# Patient Record
Sex: MALE | Race: WHITE | URBAN - METROPOLITAN AREA
[De-identification: names, ages, dates, MRNs, and addresses within clinical notes are randomized per-mention and may not be internally consistent; named-entity substitution may affect disease eponyms.]

---

## 2020-02-04 ENCOUNTER — APPOINTMENT (RX ONLY)
Dept: URBAN - METROPOLITAN AREA CLINIC 40 | Facility: CLINIC | Age: 50
Setting detail: DERMATOLOGY
End: 2020-02-04

## 2020-02-04 DIAGNOSIS — L80 VITILIGO: ICD-10-CM

## 2020-02-04 DIAGNOSIS — L81.4 OTHER MELANIN HYPERPIGMENTATION: ICD-10-CM

## 2020-02-04 PROCEDURE — ? COUNSELING

## 2020-02-04 PROCEDURE — 99202 OFFICE O/P NEW SF 15 MIN: CPT

## 2020-02-04 PROCEDURE — ? ADDITIONAL NOTES

## 2020-02-04 PROCEDURE — ? PRESCRIPTION

## 2020-02-04 RX ORDER — FLUOCINONIDE 0.5 MG/G
OINTMENT TOPICAL
Qty: 1 | Refills: 1 | Status: ERX | COMMUNITY
Start: 2020-02-04

## 2020-02-04 RX ORDER — TACROLIMUS 1 MG/G
OINTMENT TOPICAL
Qty: 1 | Refills: 1 | Status: ERX | COMMUNITY
Start: 2020-02-04

## 2020-02-04 RX ADMIN — TACROLIMUS: 1 OINTMENT TOPICAL at 00:00

## 2020-02-04 RX ADMIN — FLUOCINONIDE: 0.5 OINTMENT TOPICAL at 00:00

## 2020-02-04 ASSESSMENT — LOCATION ZONE DERM
LOCATION ZONE: FACE
LOCATION ZONE: PENIS

## 2020-02-04 ASSESSMENT — LOCATION SIMPLE DESCRIPTION DERM
LOCATION SIMPLE: PENIS
LOCATION SIMPLE: LEFT FOREHEAD

## 2020-02-04 ASSESSMENT — LOCATION DETAILED DESCRIPTION DERM
LOCATION DETAILED: LEFT SUPERIOR FOREHEAD
LOCATION DETAILED: RIGHT DORSAL SHAFT OF PENIS

## 2020-02-04 NOTE — PROCEDURE: COUNSELING
Detail Level: Simple
Patient Specific Counseling (Will Not Stick From Patient To Patient): Explained to pt this does not look like Vitiligo under Wood’s lamp, but can use topicals if he notices this changes. Encouraged pt to RTO to re-evaluate if this changes. \\nDiscussed XTRAC laser as possible treatment option if this becomes vitiligo.

## 2020-02-04 NOTE — HPI: DISCOLORATION (VITILIGO)
How Severe Is It?: mild
Is This A New Presentation, Or A Follow-Up?: Discoloration
Additional History: Pt reports Hx of Vitiligo in the past, which had been stable until a few weeks ago, when he started noticing new spots. Of note, pt reports recent sun exposure given he was in the [place], but denies peeling from a sunburn.

## 2020-02-04 NOTE — PROCEDURE: ADDITIONAL NOTES
Detail Level: Simple
Additional Notes: Ill-defined hyperpigmented patch on L forehead that appeared after beach vacation. Pt concerned re contrast with background skin and that lighter area is developing vitiligo. Woods lamp exam did not show depigmentation and I believe primary process is PIH/melasms for recent UV exposure. Explained to patient that if he notes new light areas to RTO, but safe to start tacrolimus at home while waiting for appt